# Patient Record
Sex: MALE | URBAN - METROPOLITAN AREA
[De-identification: names, ages, dates, MRNs, and addresses within clinical notes are randomized per-mention and may not be internally consistent; named-entity substitution may affect disease eponyms.]

---

## 2018-01-17 ENCOUNTER — OFFICE VISIT (OUTPATIENT)
Dept: BEHAVIORAL/MENTAL HEALTH CLINIC | Age: 28
End: 2018-01-17

## 2018-01-17 VITALS
HEART RATE: 61 BPM | DIASTOLIC BLOOD PRESSURE: 86 MMHG | WEIGHT: 195 LBS | BODY MASS INDEX: 26.41 KG/M2 | SYSTOLIC BLOOD PRESSURE: 134 MMHG | HEIGHT: 72 IN

## 2018-01-17 DIAGNOSIS — F43.24 ADJUSTMENT DISORDER WITH DISTURBANCE OF CONDUCT: ICD-10-CM

## 2018-01-17 DIAGNOSIS — F66 PORNOGRAPHY ADDICTION: Primary | ICD-10-CM
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
## 2018-01-17 NOTE — MR AVS SNAPSHOT
[address] 
[phone] Patient: Fredo Bai MRN: [id number] Serge Montez Visit Information Date & Time Provider Department Dept. Phone Encounter #  
 1/17/2018  1:30 PM [facility], [address] [phone] [id number] Upcoming Health Maintenance Date Due DTaP/Tdap/Td series (1 - Tdap) 8/8/2011 Influenza Age 5 to Adult 8/1/2017 Allergies as of 1/17/2018  Review Complete On: 1/17/2018 By: Amanda Arvizu Severity Noted Reaction Type Reactions Pcn [Penicillins]  01/17/2018    Hives Current Immunizations  Never Reviewed No immunizations on file. Not reviewed this visit Vitals BP Pulse Height(growth percentile) Weight(growth percentile) BMI Smoking Status 134/86 61 6' (1.829 m) 195 lb (88.5 kg) 26.45 kg/m2 Never Smoker BMI and BSA Data Body Mass Index Body Surface Area  
 26.45 kg/m 2 2.12 m 2 Your Updated Medication List  
  
Notice  As of 1/17/2018  2:54 PM  
 You have not been prescribed any medications. Introducing [facility]! Mitiz Rose introduces Wizpert patient portal. Now you can access parts of your medical record, email your doctor's office, and request medication refills online. 1. In your internet browser, go to [email or web address]. [employer] 2. Click on the First Time User? Click Here link in the Sign In box. You will see the New Member Sign Up page. 3. Enter your Wizpert Access Code exactly as it appears below. You will not need to use this code after youve completed the sign-up process. If you do not sign up before the expiration date, you must request a new code. · Wizpert Access Code: [email or web address] Expires: 4/17/2018  2:54 PM 
 
4.  Enter the last four digits of your Social Security Number (xxxx) and Date of Birth (mm/dd/yyyy) as indicated and click Submit. You will be taken to the next sign-up page. 5. Create a InfoScout ID. This will be your InfoScout login ID and cannot be changed, so think of one that is secure and easy to remember. 6. Create a InfoScout password. You can change your password at any time. 7. Enter your Password Reset Question and Answer. This can be used at a later time if you forget your password. 8. Enter your e-mail address. You will receive e-mail notification when new information is available in [address] 9. Click Sign Up. You can now view and download portions of your medical record. 10. Click the Download Summary menu link to download a portable copy of your medical information. If you have questions, please visit the Frequently Asked Questions section of the InfoScout website. Remember, InfoScout is NOT to be used for urgent needs. For medical emergencies, dial 911. Now available from your iPhone and Android! Please provide this summary of care documentation to your next provider. If you have any questions after today's visit, please call [phone].

## 2018-03-01 ENCOUNTER — TELEPHONE (OUTPATIENT)
Dept: BEHAVIORAL/MENTAL HEALTH CLINIC | Age: 28
End: 2018-03-01

## 2025-07-09 ENCOUNTER — APPOINTMENT (OUTPATIENT)
Facility: HOSPITAL | Age: 35
End: 2025-07-09
Payer: MEDICAID

## 2025-07-09 ENCOUNTER — HOSPITAL ENCOUNTER (EMERGENCY)
Facility: HOSPITAL | Age: 35
Discharge: HOME | End: 2025-07-09
Attending: EMERGENCY MEDICINE
Payer: MEDICAID

## 2025-07-09 ENCOUNTER — OFFICE VISIT (OUTPATIENT)
Age: 35
End: 2025-07-09

## 2025-07-09 VITALS
HEIGHT: 71 IN | DIASTOLIC BLOOD PRESSURE: 93 MMHG | OXYGEN SATURATION: 99 % | BODY MASS INDEX: 34.01 KG/M2 | SYSTOLIC BLOOD PRESSURE: 143 MMHG | RESPIRATION RATE: 18 BRPM | HEART RATE: 77 BPM | TEMPERATURE: 98.4 F | WEIGHT: 242.95 LBS

## 2025-07-09 VITALS
SYSTOLIC BLOOD PRESSURE: 124 MMHG | WEIGHT: 243 LBS | TEMPERATURE: 98.3 F | HEIGHT: 71 IN | BODY MASS INDEX: 34.02 KG/M2 | RESPIRATION RATE: 18 BRPM | HEART RATE: 82 BPM | DIASTOLIC BLOOD PRESSURE: 87 MMHG | OXYGEN SATURATION: 97 %

## 2025-07-09 DIAGNOSIS — M79.661 PAIN AND SWELLING OF RIGHT LOWER LEG: Primary | ICD-10-CM

## 2025-07-09 DIAGNOSIS — I82.4Y1 ACUTE DEEP VEIN THROMBOSIS (DVT) OF PROXIMAL VEIN OF RIGHT LOWER EXTREMITY (HCC): Primary | ICD-10-CM

## 2025-07-09 DIAGNOSIS — M79.89 PAIN AND SWELLING OF RIGHT LOWER LEG: Primary | ICD-10-CM

## 2025-07-09 LAB
ECHO BSA: 2.35 M2
VAS RIGHT FV DIST DIAM: 0.8 MM
VAS RIGHT FV MID DIAM: 0.7 MM
VAS RIGHT FV PROX DIAM: 1.1 MM
VAS RIGHT POPLITEAL VEIN DIAM: 0.7 MM

## 2025-07-09 PROCEDURE — 93971 EXTREMITY STUDY: CPT

## 2025-07-09 PROCEDURE — 6370000000 HC RX 637 (ALT 250 FOR IP): Performed by: PHYSICIAN ASSISTANT

## 2025-07-09 PROCEDURE — 99284 EMERGENCY DEPT VISIT MOD MDM: CPT

## 2025-07-09 RX ORDER — ACETAMINOPHEN 500 MG
1000 TABLET ORAL
Status: COMPLETED | OUTPATIENT
Start: 2025-07-09 | End: 2025-07-09

## 2025-07-09 RX ADMIN — APIXABAN 10 MG: 5 TABLET, FILM COATED ORAL at 14:45

## 2025-07-09 RX ADMIN — ACETAMINOPHEN 1000 MG: 500 TABLET ORAL at 14:35

## 2025-07-09 ASSESSMENT — PAIN DESCRIPTION - ORIENTATION: ORIENTATION: RIGHT

## 2025-07-09 ASSESSMENT — PAIN SCALES - GENERAL: PAINLEVEL_OUTOF10: 2

## 2025-07-09 ASSESSMENT — PAIN DESCRIPTION - LOCATION: LOCATION: LEG

## 2025-07-09 NOTE — PATIENT INSTRUCTIONS
Thank you for visiting [facility] today.     Please go to the [place] ER to rule out possible blood clot in your leg.  The address is [address] [place], [place] [id number].    A survey will be sent shortly to your phone/email.  Please complete this so we may know how to better serve you in the future.     If you begin to have shortness of breath, chest pain or uncontrollable fever of 100.4 or greater, please go to the Emergency Room.

## 2025-07-09 NOTE — ED TRIAGE NOTES
Pt c/o RLE swelling that has continued 1.5 months after a muscular injury. Pt sent from Urgent Care for doppler study. Pt is a . +smoker

## 2025-07-09 NOTE — DISCHARGE INSTRUCTIONS
Continue taking Eliquis 10 mg twice daily over the next 7 days.  After completing the 10 mg dosing for 7 days, continue with taking 5 mg twice daily.  Continue taking this until your family doctor says that you can discontinue.

## 2025-07-09 NOTE — PROGRESS NOTES
Subjective     Chief Complaint   Patient presents with    Muscle Pain     Pt states he has pulled a muscle in the right calf about a month ago. Has been to OTC meds for pain.       Patient is 34 year old male presenting with swelling to right lower leg.  Symptoms began one month ago after working out.  He believes it to be a pulled muscle.  Pain has improved somewhat but he continues to have swelling to the leg.  He is a  and smokes cigarettes.  Does not experience pain with flexion or extension of foot.      Muscle Pain        History reviewed. No pertinent past medical history.    History reviewed. No pertinent surgical history.    History reviewed. No pertinent family history.    Allergies   Allergen Reactions    Penicillins Hives       Social History     Tobacco Use    Smoking status: Former     Types: Cigarettes    Smokeless tobacco: Never    Tobacco comments:     Quit \"today\"   Substance Use Topics    Alcohol use: Not Currently       Vitals:    07/09/25 1241   BP: 124/87   Pulse:    Resp:    Temp:    SpO2:        Objective     Physical Exam  Vitals and nursing note reviewed.   Constitutional:       General: He is not in acute distress.     Appearance: Normal appearance. He is not ill-appearing.   HENT:      Head: Normocephalic and atraumatic.   Cardiovascular:      Rate and Rhythm: Normal rate.      Pulses: Normal pulses.   Pulmonary:      Effort: Pulmonary effort is normal.   Musculoskeletal:         General: Swelling present.      Right lower leg: Edema present.   Skin:     General: Skin is warm and dry.      Findings: No erythema.   Neurological:      Mental Status: He is alert and oriented to person, place, and time.     Assessment & Plan     Diagnoses and all orders for this visit:  Pain and swelling of right lower leg      Admission on 07/09/2025, Discharged on 07/09/2025   Component Date Value Ref Range Status    Body Surface Area 07/09/2025 2.35  m2 Final    Right FV Prox Diam 07/09/2025

## 2025-07-10 NOTE — ED PROVIDER NOTES
cardiologist.        RADIOLOGY:   Non-plain film images such as CT, Ultrasound and MRI are read by the radiologist. Plain radiographic images are visualized and preliminarily interpreted by the emergency physician with the below findings:        Interpretation per the Radiologist below, if available at the time of this note:    Vascular duplex lower extremity venous right   Final Result           LABS:  Labs Reviewed - No data to display    All other labs were within normal range or not returned as of this dictation.    EMERGENCY DEPARTMENT COURSE and DIFFERENTIAL DIAGNOSIS/MDM:   Vitals:    Vitals:    07/09/25 1330 07/09/25 1439   BP: (!) 141/91 (!) 143/93   Pulse: 80 77   Resp: 18    Temp: 98.4 °F (36.9 °C)    TempSrc: Oral    SpO2: 99% 99%   Weight: 110.2 kg (242 lb 15.2 oz)    Height: 1.803 m (5' 11\")            Medical Decision Making  This is a 34-year-old male  presenting due to right lower leg swelling x 1.5 months.  Vitals unremarkable.  Ultrasound shows DVTs in the right femoral, peroneal, and popliteal veins which is consistent with his history and exam.  History and exam are reassuring against phlegmasia, arterial occlusion, soft tissue infection, Achilles tendon rupture, leg fracture.  He was prescribed Eliquis for anticoagulation.  It is possible his DVT was provoked due to a long drive as a .  I recommend he follow-up with his PCP.  Return precautions given.    Risk  OTC drugs.  Prescription drug management.            REASSESSMENT            CONSULTS:  None    PROCEDURES:  Unless otherwise noted below, none     Procedures      FINAL IMPRESSION      1. Acute deep vein thrombosis (DVT) of proximal vein of right lower extremity (HCC)          DISPOSITION/PLAN   DISPOSITION Decision To Discharge 07/09/2025 02:37:08 PM      PATIENT REFERRED TO:  [facility]  [address] [address] [phone]    If symptoms worsen    Your Family

## 2025-07-14 ENCOUNTER — OFFICE VISIT (OUTPATIENT)
Age: 35
End: 2025-07-14

## 2025-07-14 VITALS
DIASTOLIC BLOOD PRESSURE: 75 MMHG | SYSTOLIC BLOOD PRESSURE: 108 MMHG | HEART RATE: 70 BPM | OXYGEN SATURATION: 96 % | RESPIRATION RATE: 18 BRPM | BODY MASS INDEX: 33.71 KG/M2 | TEMPERATURE: 98.2 F | WEIGHT: 240.8 LBS | HEIGHT: 71 IN

## 2025-07-14 DIAGNOSIS — Z75.8 DOES NOT HAVE PRIMARY CARE PROVIDER: ICD-10-CM

## 2025-07-14 DIAGNOSIS — I82.401 ACUTE DEEP VEIN THROMBOSIS (DVT) OF RIGHT LOWER EXTREMITY, UNSPECIFIED VEIN (HCC): Primary | ICD-10-CM

## 2025-07-14 NOTE — PROGRESS NOTES
Subjective     Chief Complaint   Patient presents with    needs f/u prescription for blood clots in leg     Needs f/u Rx for blood clots in leg.        Patient is 34 year old male presenting with follow up for DVT diagnosis on 7/10.  Patient seen in ER and diagnosed with DVT's in right leg.  Placed on apixaban for 30 days and follow up with PCP. He does not have PCP so he returned here.            History reviewed. No pertinent past medical history.    History reviewed. No pertinent surgical history.    History reviewed. No pertinent family history.    Allergies   Allergen Reactions    Penicillins Hives       Social History     Tobacco Use    Smoking status: Former     Types: Cigarettes    Smokeless tobacco: Never    Tobacco comments:     Quit \"today\"   Substance Use Topics    Alcohol use: Not Currently       Vitals:    07/14/25 0832   BP: 108/75   Pulse:    Resp:    Temp:    SpO2:        Objective     Physical Exam  Vitals and nursing note reviewed.   Constitutional:       General: He is not in acute distress.     Appearance: Normal appearance. He is not ill-appearing.   HENT:      Head: Normocephalic and atraumatic.   Cardiovascular:      Rate and Rhythm: Normal rate.      Pulses: Normal pulses.   Pulmonary:      Effort: Pulmonary effort is normal.   Musculoskeletal:         General: Swelling present.   Skin:     General: Skin is warm and dry.   Neurological:      Mental Status: He is alert and oriented to person, place, and time.         Assessment & Plan     Diagnoses and all orders for this visit:  Acute deep vein thrombosis (DVT) of right lower extremity, unspecified vein (HCC)  Does not have primary care provider  -     [facility]      No visits with results within 1 Day(s) from this visit.   Latest known visit with results is:   Admission on 07/09/2025, Discharged on 07/09/2025   Component Date Value Ref Range Status    Body Surface Area 07/09/2025 2.35  m2 Final    Right FV

## 2025-07-17 SDOH — HEALTH STABILITY: PHYSICAL HEALTH: ON AVERAGE, HOW MANY MINUTES DO YOU ENGAGE IN EXERCISE AT THIS LEVEL?: 20 MIN

## 2025-07-17 SDOH — HEALTH STABILITY: PHYSICAL HEALTH: ON AVERAGE, HOW MANY DAYS PER WEEK DO YOU ENGAGE IN MODERATE TO STRENUOUS EXERCISE (LIKE A BRISK WALK)?: 6 DAYS

## 2025-07-18 ENCOUNTER — OFFICE VISIT (OUTPATIENT)
Dept: PRIMARY CARE CLINIC | Facility: CLINIC | Age: 35
End: 2025-07-18

## 2025-07-18 VITALS
RESPIRATION RATE: 14 BRPM | BODY MASS INDEX: 33.74 KG/M2 | WEIGHT: 241 LBS | DIASTOLIC BLOOD PRESSURE: 83 MMHG | SYSTOLIC BLOOD PRESSURE: 126 MMHG | HEART RATE: 61 BPM | OXYGEN SATURATION: 100 % | HEIGHT: 71 IN | TEMPERATURE: 97.1 F

## 2025-07-18 DIAGNOSIS — Z76.89 ENCOUNTER TO ESTABLISH CARE WITH NEW PROVIDER: ICD-10-CM

## 2025-07-18 DIAGNOSIS — Z13.220 SCREENING FOR HYPERLIPIDEMIA: ICD-10-CM

## 2025-07-18 DIAGNOSIS — I82.401 ACUTE DEEP VEIN THROMBOSIS (DVT) OF RIGHT LOWER EXTREMITY, UNSPECIFIED VEIN (HCC): ICD-10-CM

## 2025-07-18 DIAGNOSIS — Z13.1 SCREENING FOR DIABETES MELLITUS (DM): ICD-10-CM

## 2025-07-18 DIAGNOSIS — Z00.00 ANNUAL PHYSICAL EXAM: ICD-10-CM

## 2025-07-18 DIAGNOSIS — Z00.00 ANNUAL PHYSICAL EXAM: Primary | ICD-10-CM

## 2025-07-18 SDOH — ECONOMIC STABILITY: FOOD INSECURITY: WITHIN THE PAST 12 MONTHS, THE FOOD YOU BOUGHT JUST DIDN'T LAST AND YOU DIDN'T HAVE MONEY TO GET MORE.: NEVER TRUE

## 2025-07-18 SDOH — ECONOMIC STABILITY: FOOD INSECURITY: WITHIN THE PAST 12 MONTHS, YOU WORRIED THAT YOUR FOOD WOULD RUN OUT BEFORE YOU GOT MONEY TO BUY MORE.: NEVER TRUE

## 2025-07-18 ASSESSMENT — PATIENT HEALTH QUESTIONNAIRE - PHQ9
SUM OF ALL RESPONSES TO PHQ QUESTIONS 1-9: 0
2. FEELING DOWN, DEPRESSED OR HOPELESS: NOT AT ALL
SUM OF ALL RESPONSES TO PHQ QUESTIONS 1-9: 0
1. LITTLE INTEREST OR PLEASURE IN DOING THINGS: NOT AT ALL

## 2025-07-18 ASSESSMENT — ENCOUNTER SYMPTOMS
RHINORRHEA: 0
DIARRHEA: 0
COUGH: 0
BACK PAIN: 0
CONSTIPATION: 0
EYE DISCHARGE: 0
SORE THROAT: 0
CHEST TIGHTNESS: 0
ABDOMINAL PAIN: 0
SHORTNESS OF BREATH: 0
COLOR CHANGE: 0

## 2025-07-18 NOTE — PROGRESS NOTES
Have you been to the ER, urgent care clinic since your last visit?  Hospitalized since your last visit?   YES - When: approximately July 9 and July 14th ago.  Where and Why: Blood clots.    Have you seen or consulted any other health care providers outside our system since your last visit?   NO         
[facility]   [address], [address]  [place] [address]  P: [phone]  F: [phone]    SUBJECTIVE     HPI:     Ari Davison is a 34 y.o. male who is seen in the clinic for   Chief Complaint   Patient presents with    New Patient     Patient states he would like to discuss his 2 blood clots that were found in right leg and he may need refill on eliquis        The patient presents to the office today to establish care and annual exam. Patient reports he was diagnosed with 2 blood clots in her right leg in the ED and put on Eliquis. Patient states he was a smoker and has now stopped since starting the medication. Patient states he has not seen a PCP in  \"a long time\".     Patient endorses a family history of DMII and HTN but not personal history. Patient reports he also works out regularly because he is .     Patient reports pain in the calf, warmth, and swelling but now the pain has subsided and it is just tender and still swollen. Patient denies any SOB, chest pain, palpitations, nausea, vomiting, diarrhea, constipation, dizziness, or headaches.        History reviewed. No pertinent past medical history.  Current Outpatient Medications   Medication Sig Dispense Refill    apixaban (ELIQUIS) 5 MG TABS tablet Take 1 tablet by mouth 2 times daily 180 tablet 1     No current facility-administered medications for this visit.     Allergies   Allergen Reactions    Penicillins Hives     Immunization History   Administered Date(s) Administered    COVID-19, J&J, (age 18y+), IM, 0.5 mL 10/28/2021    DTaP, DAPTACEL, (age 6w-6y), IM, 0.5mL 1990, 1990, 02/12/1991, 11/26/1991, 08/22/1995, 03/29/2005    Hep B, ENGERIX-B, RECOMBIVAX-HB, (age Birth - 19y), IM, 0.5mL 09/28/2001, 11/16/2001, 03/01/2002    Influenza Virus Vaccine 02/28/2015    MMR, PRIORIX, M-M-R II, (age 12m+), SC, 0.5mL 11/26/1991, 08/22/1995      History reviewed. No pertinent family history.  Past Surgical History: 
Joanie Rodriguez(Attending)

## 2025-07-19 LAB
ALBUMIN SERPL-MCNC: 4 G/DL (ref 3.5–5)
ALBUMIN/GLOB SERPL: 1 (ref 1.1–2.2)
ALP SERPL-CCNC: 68 U/L (ref 45–117)
ALT SERPL-CCNC: 22 U/L (ref 12–78)
ANION GAP SERPL CALC-SCNC: 4 MMOL/L (ref 2–12)
AST SERPL-CCNC: 18 U/L (ref 15–37)
BASOPHILS # BLD: 0.01 K/UL (ref 0–0.1)
BASOPHILS NFR BLD: 0.2 % (ref 0–1)
BILIRUB SERPL-MCNC: 0.5 MG/DL (ref 0.2–1)
BUN SERPL-MCNC: 14 MG/DL (ref 6–20)
BUN/CREAT SERPL: 11 (ref 12–20)
CALCIUM SERPL-MCNC: 9.9 MG/DL (ref 8.5–10.1)
CHLORIDE SERPL-SCNC: 104 MMOL/L (ref 97–108)
CHOLEST SERPL-MCNC: 165 MG/DL
CO2 SERPL-SCNC: 28 MMOL/L (ref 21–32)
CREAT SERPL-MCNC: 1.27 MG/DL (ref 0.7–1.3)
DIFFERENTIAL METHOD BLD: ABNORMAL
EOSINOPHIL # BLD: 0.11 K/UL (ref 0–0.4)
EOSINOPHIL NFR BLD: 2 % (ref 0–7)
ERYTHROCYTE [DISTWIDTH] IN BLOOD BY AUTOMATED COUNT: 14.6 % (ref 11.5–14.5)
EST. AVERAGE GLUCOSE BLD GHB EST-MCNC: 100 MG/DL
GLOBULIN SER CALC-MCNC: 3.9 G/DL (ref 2–4)
GLUCOSE SERPL-MCNC: 93 MG/DL (ref 65–100)
HBA1C MFR BLD: 5.1 % (ref 4–5.6)
HCT VFR BLD AUTO: 43.9 % (ref 36.6–50.3)
HDLC SERPL-MCNC: 54 MG/DL
HDLC SERPL: 3.1 (ref 0–5)
HGB BLD-MCNC: 13.9 G/DL (ref 12.1–17)
IMM GRANULOCYTES # BLD AUTO: 0.01 K/UL (ref 0–0.04)
IMM GRANULOCYTES NFR BLD AUTO: 0.2 % (ref 0–0.5)
LDLC SERPL CALC-MCNC: 97 MG/DL (ref 0–100)
LYMPHOCYTES # BLD: 2.7 K/UL (ref 0.8–3.5)
LYMPHOCYTES NFR BLD: 48.1 % (ref 12–49)
MCH RBC QN AUTO: 29.6 PG (ref 26–34)
MCHC RBC AUTO-ENTMCNC: 31.7 G/DL (ref 30–36.5)
MCV RBC AUTO: 93.4 FL (ref 80–99)
MONOCYTES # BLD: 0.6 K/UL (ref 0–1)
MONOCYTES NFR BLD: 10.7 % (ref 5–13)
NEUTS SEG # BLD: 2.18 K/UL (ref 1.8–8)
NEUTS SEG NFR BLD: 38.8 % (ref 32–75)
NRBC # BLD: 0 K/UL (ref 0–0.01)
NRBC BLD-RTO: 0 PER 100 WBC
PLATELET # BLD AUTO: 237 K/UL (ref 150–400)
PMV BLD AUTO: 11.2 FL (ref 8.9–12.9)
POTASSIUM SERPL-SCNC: 4.2 MMOL/L (ref 3.5–5.1)
PROT SERPL-MCNC: 7.9 G/DL (ref 6.4–8.2)
RBC # BLD AUTO: 4.7 M/UL (ref 4.1–5.7)
SODIUM SERPL-SCNC: 136 MMOL/L (ref 136–145)
TRIGL SERPL-MCNC: 70 MG/DL
VLDLC SERPL CALC-MCNC: 14 MG/DL
WBC # BLD AUTO: 5.6 K/UL (ref 4.1–11.1)